# Patient Record
Sex: FEMALE | Race: WHITE | URBAN - METROPOLITAN AREA
[De-identification: names, ages, dates, MRNs, and addresses within clinical notes are randomized per-mention and may not be internally consistent; named-entity substitution may affect disease eponyms.]

---

## 2018-01-15 ENCOUNTER — OFFICE VISIT (OUTPATIENT)
Dept: ORTHOPEDIC SURGERY | Age: 48
End: 2018-01-15

## 2018-01-15 VITALS
HEIGHT: 64 IN | BODY MASS INDEX: 23.03 KG/M2 | HEART RATE: 78 BPM | WEIGHT: 134.92 LBS | DIASTOLIC BLOOD PRESSURE: 80 MMHG | SYSTOLIC BLOOD PRESSURE: 128 MMHG

## 2018-01-15 DIAGNOSIS — S52.122A CLOSED DISPLACED FRACTURE OF HEAD OF LEFT RADIUS, INITIAL ENCOUNTER: ICD-10-CM

## 2018-01-15 DIAGNOSIS — M25.522 LEFT ELBOW PAIN: Primary | ICD-10-CM

## 2018-01-15 PROCEDURE — 4004F PT TOBACCO SCREEN RCVD TLK: CPT | Performed by: ORTHOPAEDIC SURGERY

## 2018-01-15 PROCEDURE — G8427 DOCREV CUR MEDS BY ELIG CLIN: HCPCS | Performed by: ORTHOPAEDIC SURGERY

## 2018-01-15 PROCEDURE — G8484 FLU IMMUNIZE NO ADMIN: HCPCS | Performed by: ORTHOPAEDIC SURGERY

## 2018-01-15 PROCEDURE — G8420 CALC BMI NORM PARAMETERS: HCPCS | Performed by: ORTHOPAEDIC SURGERY

## 2018-01-15 PROCEDURE — 99203 OFFICE O/P NEW LOW 30 MIN: CPT | Performed by: ORTHOPAEDIC SURGERY

## 2018-01-15 NOTE — PROGRESS NOTES
Gait: normal    Left Elbow Examination  Inspection:  No bruising or skin injury    Palpation:  Tenderness lateral aspect of elbow and over radial head    Range of Motion:  Extension to 10° from full, flexion to 120, supination 45, pronation 45    Sensation: In tact to light touch all nerve distributions     Strength: Motor intact AIN/PIN/M/R/U    Special Tests:  None    Skin: There are no additional worrisome rashes, ulcerations or lesions. Circulation normal    Additional Examinations:  Right Upper Extremity:  Examination of the right upper extremity does not show any tenderness, deformity or injury. Range of motion is unremarkable. There is no gross instability. There are no rashes, ulcerations or lesions. Strength and tone are normal.      Radiology:     X-rays obtained and reviewed in office:  AP, lateral, oblique 3 views of the left elbow obtained 1/15/18 and compared to previous emergency department views demonstrate mildly displaced left radial head fracture with 1 mm step-off and good joint congruency      Assessment:  60-year-old female with left radial head fracture    Office Procedures:  Orders Placed This Encounter   Procedures    XR ELBOW LEFT (MIN 3 VIEWS)     [id number]     Order Specific Question:   Reason for exam:     Answer:   Elbow Pain       Plan:   -Given her exam and x-ray findings this is does appear to be fractured can be treated nonsurgically and we will move forward with this treatment. She has a sling which she will wear for comfort  -We discussed active range of motion exercises to work on range of motion which she should start immediately and were demonstrated to her today  -I'll see her back in 4 weeks for repeat evaluation and if she is has any issues in the interim she should contact the office  -Over-the-counter medications for pain relief      Voice Recognition Dictation disclaimer: Please note that portions of this chart were generated using Dragon dictation software. Although every effort was made to ensure the accuracy of this automated transcription, some errors in transcription may have occurred.